# Patient Record
Sex: FEMALE | Race: WHITE | NOT HISPANIC OR LATINO | URBAN - METROPOLITAN AREA
[De-identification: names, ages, dates, MRNs, and addresses within clinical notes are randomized per-mention and may not be internally consistent; named-entity substitution may affect disease eponyms.]

---

## 2020-10-28 ENCOUNTER — EMERGENCY (EMERGENCY)
Facility: HOSPITAL | Age: 65
LOS: 1 days | End: 2020-10-28
Admitting: EMERGENCY MEDICINE
Payer: OTHER MISCELLANEOUS

## 2020-10-28 PROCEDURE — 73630 X-RAY EXAM OF FOOT: CPT | Mod: 26,RT

## 2020-10-28 PROCEDURE — 28470 CLTX METATARSAL FX WO MNP EA: CPT | Mod: 54

## 2020-10-28 PROCEDURE — 99284 EMERGENCY DEPT VISIT MOD MDM: CPT | Mod: 57

## 2024-08-04 ENCOUNTER — RESULT CHARGE (OUTPATIENT)
Age: 69
End: 2024-08-04

## 2024-08-05 ENCOUNTER — LABORATORY RESULT (OUTPATIENT)
Age: 69
End: 2024-08-05

## 2024-08-05 ENCOUNTER — APPOINTMENT (OUTPATIENT)
Dept: INTERNAL MEDICINE | Facility: CLINIC | Age: 69
End: 2024-08-05

## 2024-08-05 ENCOUNTER — NON-APPOINTMENT (OUTPATIENT)
Age: 69
End: 2024-08-05

## 2024-08-05 PROBLEM — Z85.3 HISTORY OF MALIGNANT NEOPLASM OF LEFT BREAST: Status: RESOLVED | Noted: 2024-08-05 | Resolved: 2024-08-05

## 2024-08-05 PROBLEM — Z85.3: Status: RESOLVED | Noted: 2024-08-05 | Resolved: 2024-08-05

## 2024-08-05 PROBLEM — Z00.00 ENCOUNTER FOR PREVENTIVE HEALTH EXAMINATION: Status: ACTIVE | Noted: 2024-08-01

## 2024-08-05 PROBLEM — Z20.828 VIRAL DISEASE EXPOSURE: Status: ACTIVE | Noted: 2024-08-05

## 2024-08-05 PROCEDURE — 99387 INIT PM E/M NEW PAT 65+ YRS: CPT

## 2024-08-05 PROCEDURE — 93000 ELECTROCARDIOGRAM COMPLETE: CPT | Mod: 59

## 2024-08-05 PROCEDURE — G0444 DEPRESSION SCREEN ANNUAL: CPT | Mod: 59

## 2024-08-05 NOTE — HISTORY OF PRESENT ILLNESS
[de-identified] : 69-year-old white female presents for complete checkup patient denies chest pain shortness of breath fever chills abdominal pain recently she awoke in the middle of the night with palpitations which lasted 20 minutes and then resolved the symptoms have not recurred she denied dizziness nausea vomiting diaphoresis

## 2024-08-05 NOTE — PHYSICAL EXAM
[No Acute Distress] : no acute distress [Well Nourished] : well nourished [Well Developed] : well developed [Well-Appearing] : well-appearing [Normal Sclera/Conjunctiva] : normal sclera/conjunctiva [PERRL] : pupils equal round and reactive to light [EOMI] : extraocular movements intact [Normal Outer Ear/Nose] : the outer ears and nose were normal in appearance [Normal Oropharynx] : the oropharynx was normal [No JVD] : no jugular venous distention [No Lymphadenopathy] : no lymphadenopathy [Supple] : supple [Thyroid Normal, No Nodules] : the thyroid was normal and there were no nodules present [No Respiratory Distress] : no respiratory distress  [No Accessory Muscle Use] : no accessory muscle use [Clear to Auscultation] : lungs were clear to auscultation bilaterally [Normal Rate] : normal rate  [Regular Rhythm] : with a regular rhythm [Normal S1, S2] : normal S1 and S2 [No Carotid Bruits] : no carotid bruits [No Abdominal Bruit] : a ~M bruit was not heard ~T in the abdomen [No Varicosities] : no varicosities [Pedal Pulses Present] : the pedal pulses are present [No Edema] : there was no peripheral edema [No Palpable Aorta] : no palpable aorta [No Extremity Clubbing/Cyanosis] : no extremity clubbing/cyanosis [Soft] : abdomen soft [Non Tender] : non-tender [Non-distended] : non-distended [No HSM] : no HSM [Normal Bowel Sounds] : normal bowel sounds [Normal Posterior Cervical Nodes] : no posterior cervical lymphadenopathy [Normal Anterior Cervical Nodes] : no anterior cervical lymphadenopathy [No CVA Tenderness] : no CVA  tenderness [No Spinal Tenderness] : no spinal tenderness [No Joint Swelling] : no joint swelling [Grossly Normal Strength/Tone] : grossly normal strength/tone [No Rash] : no rash [Coordination Grossly Intact] : coordination grossly intact [No Focal Deficits] : no focal deficits [Normal Gait] : normal gait [Normal Affect] : the affect was normal [Normal Insight/Judgement] : insight and judgment were intact [No Masses] : no palpable masses [No Nipple Discharge] : no nipple discharge [No Axillary Lymphadenopathy] : no axillary lymphadenopathy [de-identified] : cheryle [de-identified] : left scar well healed

## 2024-08-05 NOTE — HEALTH RISK ASSESSMENT
[Yes] : Yes [2 - 3 times a week (3 pts)] : 2 - 3  times a week (3 points) [Never (0 pts)] : Never (0 points) [No falls in past year] : Patient reported no falls in the past year [0] : 2) Feeling down, depressed, or hopeless: Not at all (0) [PHQ-2 Negative - No further assessment needed] : PHQ-2 Negative - No further assessment needed [Never] : Never [NO] : No [Hepatitis C test offered] : Hepatitis C test offered [With Family] : lives with family [Employed] : employed [de-identified] : 5 min [[id number]] : 0 [Change in mental status noted] : No change in mental status noted [MammogramDate] : 2/24 [PapSmearDate] : 2023 [BoneDensityDate] : 2023 [BoneDensityComments] : on alendronate [ColonoscopyDate] : 2021 [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

## 2024-08-05 NOTE — ASSESSMENT
[FreeTextEntry1] : Diet and exercise Check complete blood work Pap mammogram breast self-exam Bone density colonoscopy ophthalmology vitamin D Depression screen done and reviewed 5 minutes Up-to-date on vaccines Breast cancer follow-up oncology Hyperlipidemia check blood work and advise Palpitations currently asymptomatic refer to cardiology for evaluation Will call back if symptoms recur Follow-up after cardiology evaluation completed

## 2024-08-07 ENCOUNTER — TRANSCRIPTION ENCOUNTER (OUTPATIENT)
Age: 69
End: 2024-08-07

## 2024-08-07 ENCOUNTER — APPOINTMENT (OUTPATIENT)
Dept: CARDIOLOGY | Facility: CLINIC | Age: 69
End: 2024-08-07

## 2024-08-07 PROBLEM — E78.5 HYPERLIPIDEMIA, UNSPECIFIED HYPERLIPIDEMIA TYPE: Status: ACTIVE | Noted: 2024-08-05

## 2024-08-07 PROBLEM — R00.2 PALPITATIONS: Status: ACTIVE | Noted: 2024-08-05

## 2024-08-07 PROBLEM — R94.31 ABNORMAL ECG: Status: ACTIVE | Noted: 2024-08-07

## 2024-08-07 PROBLEM — R03.0 ELEVATED BP WITHOUT DIAGNOSIS OF HYPERTENSION: Status: ACTIVE | Noted: 2024-08-07

## 2024-08-07 PROCEDURE — 99204 OFFICE O/P NEW MOD 45 MIN: CPT

## 2024-08-07 NOTE — ASSESSMENT
[FreeTextEntry1] : She will check her BP at home and f/u with Dr. Berrios if it is elevated She has an Apple Watch and will wear it regularly A stress test and echocardiogram were ordered for her possible SVT A calcium score was ordered for better risk stratification (she did heave mild plaque on a carotid duplex)

## 2024-08-07 NOTE — REASON FOR VISIT
[FreeTextEntry1] : The patient has a history of palpitations (fleeting chest flutters) and an abnormal ECG (PRWP and non specific T wave changes) She c/o an episode of a fast, regular heart beat (about 180bpm) that woke her from sleep. The paroxysm lasted for about 20min before it started to slow down. She fell asleep before it fully broke There were no associated symptoms during the episode She otherwise feels well and is w/o cardiac complaint Her exam is benign Lytes are normal. CBC is normal TC is elevated but [id number] Her BP is elevated (normal at home) which she ascribes to being nervous; otherwise her exam is benign

## 2024-08-29 ENCOUNTER — APPOINTMENT (OUTPATIENT)
Dept: CARDIOLOGY | Facility: CLINIC | Age: 69
End: 2024-08-29
Payer: COMMERCIAL

## 2024-08-29 ENCOUNTER — NON-APPOINTMENT (OUTPATIENT)
Age: 69
End: 2024-08-29

## 2024-08-29 PROCEDURE — 93306 TTE W/DOPPLER COMPLETE: CPT

## 2024-08-29 PROCEDURE — 93015 CV STRESS TEST SUPVJ I&R: CPT

## 2024-09-26 ENCOUNTER — APPOINTMENT (OUTPATIENT)
Dept: CT IMAGING | Facility: CLINIC | Age: 69
End: 2024-09-26

## 2024-09-26 PROCEDURE — 75571 CT HRT W/O DYE W/CA TEST: CPT
